# Patient Record
(demographics unavailable — no encounter records)

---

## 2025-04-10 NOTE — ASSESSMENT
[FreeTextEntry1] : AMS/ uti left hip bruising cant stand resting tachy 110 advised to call ems- daughter agreed.  txr to [facility] [Vaccines Reviewed] : Immunizations reviewed today. Please see immunization details in the vaccine log within the immunization flowsheet.

## 2025-04-10 NOTE — PHYSICAL EXAM
[No Acute Distress] : no acute distress [No Murmur] : no murmur heard [Normal] : no rash [Speech Grossly Normal] : speech grossly normal [Normal Mood] : the mood was normal [de-identified] : in w/c, a/o x2, smiling. verbal , pleasantly confused. follows simple commands. [de-identified] : danisha, 120 reg.  [de-identified] : moves ext x 4, not from . lue and lle seem limited. no pain on prom, wont stand.  [de-identified] : as above

## 2025-04-10 NOTE — HISTORY OF PRESENT ILLNESS
[FreeTextEntry1] : falls [de-identified] : Over the past week, pt has fallen > 3 times. has some mild change in MS, per daughter.  doesnt know year. generally walks around the house and helps herself to food and drink and using bathroom now when asked to stand, she doesnt follow instructions and just sits and talks to herself. moves ext x 4 but not from. no pain on passive rom.  Reviewed all interim as well as relevant prior consultations, labs and radiological studies.